# Patient Record
Sex: FEMALE | Race: BLACK OR AFRICAN AMERICAN | NOT HISPANIC OR LATINO | URBAN - METROPOLITAN AREA
[De-identification: names, ages, dates, MRNs, and addresses within clinical notes are randomized per-mention and may not be internally consistent; named-entity substitution may affect disease eponyms.]

---

## 2017-02-03 ENCOUNTER — EMERGENCY (EMERGENCY)
Facility: HOSPITAL | Age: 52
LOS: 1 days | Discharge: HOME | End: 2017-02-03
Attending: EMERGENCY MEDICINE | Admitting: EMERGENCY MEDICINE
Payer: SELF-PAY

## 2017-02-03 VITALS
OXYGEN SATURATION: 97 % | DIASTOLIC BLOOD PRESSURE: 80 MMHG | WEIGHT: 141.54 LBS | SYSTOLIC BLOOD PRESSURE: 167 MMHG | TEMPERATURE: 98 F | RESPIRATION RATE: 79 BRPM

## 2017-02-03 DIAGNOSIS — J34.89 OTHER SPECIFIED DISORDERS OF NOSE AND NASAL SINUSES: ICD-10-CM

## 2017-02-03 DIAGNOSIS — F17.200 NICOTINE DEPENDENCE, UNSPECIFIED, UNCOMPLICATED: ICD-10-CM

## 2017-02-03 DIAGNOSIS — R09.81 NASAL CONGESTION: ICD-10-CM

## 2017-02-03 DIAGNOSIS — I10 ESSENTIAL (PRIMARY) HYPERTENSION: ICD-10-CM

## 2017-02-03 PROCEDURE — 99282 EMERGENCY DEPT VISIT SF MDM: CPT | Mod: 25

## 2017-02-03 PROCEDURE — 99053 MED SERV 10PM-8AM 24 HR FAC: CPT

## 2017-02-03 PROCEDURE — 99282 EMERGENCY DEPT VISIT SF MDM: CPT

## 2017-02-03 RX ORDER — SODIUM CHLORIDE 0.65 %
2 AEROSOL, SPRAY (ML) NASAL
Qty: 1 | Refills: 0 | OUTPATIENT
Start: 2017-02-03 | End: 2017-02-13

## 2017-02-03 NOTE — ED PROVIDER NOTE - MEDICAL DECISION MAKING DETAILS
Nasal congestion and clear bl rhinorrhea x 2 weeks.  No signs of bacterial sinusitis, flu, pna, sepsis.  VSS.  HTN noted - educated on this given pt hx with CVA.  Will fu w her PCP in [place].  Plan outpt tx with saline spray and fu.

## 2017-02-03 NOTE — ED ADULT NURSE NOTE - OBJECTIVE STATEMENT
52 yr old female presents to the ed with cough for 2 weeks with productive yellow sputum. denies any fever, chills, generalized body weakness or any other medical complaints. evaluted by md ramirez. no distress  noted. hx of right sided paralysis for 5 yrs. will continue to monitor.

## 2017-02-03 NOTE — ED PROVIDER NOTE - ENMT, MLM
Head is atraumatic normocephalic.  External ears normal and TMs visualized and normal.  No nasal secretions or epistaxis.  Posterior pharynx normal and airway is patent.  No tonsillar enlargement or exudates.  Uvula midline.  Tongue is normal.  No angioedema and no stridor.  Neck is supple without edema or adenopathy.  No carotid bruits.  No midline c-spine tenderness.  Normal voice.  Tolerating secretions.  FROM and no meningeal signs.

## 2017-02-03 NOTE — ED PROVIDER NOTE - OBJECTIVE STATEMENT
51 yo F presenting with nasal congestion and bl clear rhinorrhea x 2 weeks.  Pt has hx of CVA in remote past with R sided deficits, smoker.  Denies cough, headache, fever, chills, sore throat or new weakness.  Takes asa daily.  No epistaxis.

## 2019-06-19 NOTE — ED ADULT TRIAGE NOTE - ARRIVAL FROM
Home [Fatigue] : fatigue [Cough] : cough [Hemoptysis] : no hemoptysis [Dyspnea] : dyspnea [Sputum] : sputum  [Pleuritic Pain] : no pleuritic pain [Chest Tightness] : no chest tightness [Wheezing] : wheezing [Frequent URIs] : no frequent upper respiratory infections [Negative] : Musculoskeletal [FreeTextEntry8] : dyspnea, cough,  wheezing are all MUCH better [FreeTextEntry9] : slgiht cough still

## 2023-05-25 NOTE — ED ADULT TRIAGE NOTE - STATUS:
Applied Home Suture Removal Text: Patient was provided instructions on removing sutures and will remove their sutures at home.  If they have any questions or difficulties they will call the office.